# Patient Record
(demographics unavailable — no encounter records)

---

## 2024-11-18 NOTE — HISTORY OF PRESENT ILLNESS
[Born at ___ Wks Gestation] : The patient was born at [unfilled] weeks gestation [] : via normal spontaneous vaginal delivery [Other: _____] : at [unfilled] [(1) _____] : [unfilled] [(5) _____] : [unfilled] [Nuchal Cord] : nuchal cord [BW: _____] : weight of [unfilled] [Length: _____] : length of [unfilled] [HC: _____] : head circumference of [unfilled] [DW: _____] : Discharge weight was [unfilled] [Time of Birth: _____] : Time of birth was [unfilled] [Significant Hx: ____] : The mother's  medical history is significant for [unfilled] [None] : There are no risk factors [FreeTextEntry8] : cchd normal [id number] hearing test passed  poct 2.7 at 712am on 11/16 a+ c_ [Well-balanced] : well-balanced [Normal] : Normal [___ voids per day] : [unfilled] voids per day [Frequency of stools: ___] : Frequency of stools: [unfilled]  stools [per day] : per day. [In Bassinet/Crib] : sleeps in bassinet/crib [On back] : sleeps on back [Co-sleeping] : no co-sleeping [Loose bedding, pillow, toys, and/or bumpers in crib] : no loose bedding, pillow, toys, and/or bumpers in crib [Hepatitis B Vaccine Given] : Hepatitis B vaccine given [Nirsevimab Given] : Nirsevimab not given [FreeTextEntry1] : mmom marginal cord insertion.  100mg zoloft for anxiety valtrex twice per day starting at 36 weeks.   discharged on 11/16 fussy at breast and suck for a few seconds and then become upset mom started to pump  getting 1 oz after an hour 20 mins      RSV antibody at 37 weeks received by mom.

## 2024-11-18 NOTE — HISTORY OF PRESENT ILLNESS
[Born at ___ Wks Gestation] : The patient was born at [unfilled] weeks gestation [] : via normal spontaneous vaginal delivery [Other: _____] : at [unfilled] [(1) _____] : [unfilled] [(5) _____] : [unfilled] [Nuchal Cord] : nuchal cord [BW: _____] : weight of [unfilled] [Length: _____] : length of [unfilled] [HC: _____] : head circumference of [unfilled] [DW: _____] : Discharge weight was [unfilled] [Time of Birth: _____] : Time of birth was [unfilled] [Significant Hx: ____] : The mother's  medical history is significant for [unfilled] [None] : There are no risk factors [FreeTextEntry8] : cchd normal [id number] hearing test passed  poct 2.7 at 712am on 11/16 a+ c_ [Well-balanced] : well-balanced [Normal] : Normal [___ voids per day] : [unfilled] voids per day [Frequency of stools: ___] : Frequency of stools: [unfilled]  stools [per day] : per day. [In Bassinet/Crib] : sleeps in bassinet/crib [On back] : sleeps on back [Co-sleeping] : no co-sleeping [Loose bedding, pillow, toys, and/or bumpers in crib] : no loose bedding, pillow, toys, and/or bumpers in crib [Hepatitis B Vaccine Given] : Hepatitis B vaccine given [Nirsevimab Given] : Nirsevimab not given [FreeTextEntry1] : mmom marginal cord insertion.  100mg zoloft for anxiety valtrex twice per day starting at 36 weeks.   discharged on 11/16 fussy at breast and suck for a few seconds and then become upset mom started to pump  getting 1 oz after an hour 20 mins      RSV antibody at 37 weeks received by mom.

## 2024-11-29 NOTE — HISTORY OF PRESENT ILLNESS
[de-identified] : Weight check [FreeTextEntry6] : Makenzie is an 11 day old ex full term F who presents for weight check.  BW 3000g Feeds EHM  1 oz every hour.  Started vit D supplement Voiding and stooling appropriately

## 2024-11-29 NOTE — HISTORY OF PRESENT ILLNESS
[de-identified] : Weight check [FreeTextEntry6] : Makenzie is an 11 day old ex full term F who presents for weight check.  BW 3000g Feeds EHM  1 oz every hour.  Started vit D supplement Voiding and stooling appropriately

## 2024-11-29 NOTE — PHYSICAL EXAM
[No Acute Distress] : no acute distress [Alert] : alert [Normocephalic] : normocephalic [Discharge] : no discharge [Pink Nasal Mucosa] : pink nasal mucosa [Erythematous Oropharynx] : nonerythematous oropharynx [Clear to Auscultation Bilaterally] : clear to auscultation bilaterally [Regular Rate and Rhythm] : regular rate and rhythm [Normal S1, S2 audible] : normal S1, S2 audible [Murmurs] : no murmurs [Soft] : soft [Tender] : nontender [Distended] : nondistended [Normal Bowel Sounds] : normal bowel sounds [Hepatosplenomegaly] : no hepatosplenomegaly [Normal External Genitalia] : normal external genitalia [Moves All Extremities x 4] : moves all extremities x4 [Normotonic] : normotonic [NL] : warm, clear [FreeTextEntry2] : flat anterior fontanelle [FreeTextEntry5] : red reflex b/l [FreeTextEntry3] : normal outer appearance

## 2024-11-29 NOTE — DISCUSSION/SUMMARY
[FreeTextEntry1] : Makenzie is an 11 day old ex full term F who presents for weight check. Regained birth weight.  Continue current feeding plan and vitamin D.  NBS wnl.  Mother received RSV vaccine. Return for 1 mo wce

## 2024-12-05 NOTE — HISTORY OF PRESENT ILLNESS
[Normal] : Normal [Frequency of stools: ___] : Frequency of stools: [unfilled]  stools [per day] : per day. [Yellow] : yellow [Seedy] : seedy [In Bassinet/Crib] : sleeps in bassinet/crib [On back] : sleeps on back [No] : No cigarette smoke exposure [Rear facing car seat in back seat] : Rear facing car seat in back seat [Carbon Monoxide Detectors] : Carbon monoxide detectors at home [Smoke Detectors] : Smoke detectors at home. [Mother] : mother [Father] : father [Breast milk] : breast milk [Vitamins ___] : Patient takes [unfilled] vitamins daily [Well-balanced] : well-balanced [___ voids per day] : [unfilled] voids per day [Co-sleeping] : no co-sleeping [Loose bedding, pillow, toys, and/or bumpers in crib] : no loose bedding, pillow, toys, and/or bumpers in crib [Exposure to electronic nicotine delivery system] : No exposure to electronic nicotine delivery system [FreeTextEntry1] : Makenzie is a 21 day old F who presents early for 1 mo wce. Will be in [place] through early Jan Feeds EHM 2-4oz 8x per day.  Takes vitamin D.

## 2024-12-05 NOTE — PHYSICAL EXAM
[Alert] : alert [Acute Distress] : no acute distress [Normocephalic] : normocephalic [Flat Open Anterior [place]] : flat open anterior fontanelle [Red Reflex Bilateral] : red reflex bilateral [Normally Placed Ears] : normally placed ears [Auricles Well Formed] : auricles well formed [Nares Patent] : nares patent [Uvula Midline] : uvula midline [Supple, full passive range of motion] : supple, full passive range of motion [Clear to Auscultation Bilaterally] : clear to auscultation bilaterally [Regular Rate and Rhythm] : regular rate and rhythm [S1, S2 present] : S1, S2 present [Murmurs] : no murmurs [+2 Femoral Pulses] : +2 femoral pulses [Soft] : soft [Tender] : nontender [Distended] : not distended [Bowel Sounds] : bowel sounds present [Hepatomegaly] : no hepatomegaly [Splenomegaly] : no splenomegaly [Normal external genitailia] : normal external genitalia [Normally Placed] : normally placed [Bustos-Ortolani] : negative Bustos-Ortolani [Symmetric Flexed Extremities] : symmetric flexed extremities [Straight] : straight [Startle Reflex] : startle reflex present [Suck Reflex] : suck reflex present [Palmar Grasp] : palmar grasp reflex present [Plantar Grasp] : plantar grasp reflex present [Symmetric [place]] : symmetric [place] [Rash and/or lesion present] : no rash/lesion

## 2024-12-05 NOTE — DISCUSSION/SUMMARY
[Normal Growth] : growth [Normal Development] : development  [No Elimination Concerns] : elimination [Continue Regimen] : feeding [No Skin Concerns] : skin [Normal Sleep Pattern] : sleep [None] : no medical problems [Anticipatory Guidance Given] : Anticipatory guidance addressed as per the history of present illness section [Parental Well-Being] : parental well-being [Family Adjustment] : family adjustment [Feeding Routines] : feeding routines [Infant Adjustment] : infant adjustment [Safety] : safety [No Medications] : ~He/She~ is not on any medications [Mother] : mother [Father] : father [FreeTextEntry1] : Makenzie is a 21 day old F who presents for wce. Growing and developing well for age.   #1mo - Recommend exclusive breastfeeding, 8-12 feedings per day. Mother should continue prenatal vitamins and avoid alcohol. If formula is needed, recommend iron-fortified formulations, 2-4 oz every 2-3 hrs. When in car, patient should be in rear-facing car seat in back seat. Put baby to sleep on back, in own crib with no loose or soft bedding. Help baby to develop sleep and feeding routines. May offer pacifier if needed. Start tummy time when awake. Limit baby's exposure to others, especially those with fever or unknown vaccine status. Parents counseled to call if rectal temperature >100.4 degrees F. - return for 2 mo wce

## 2024-12-05 NOTE — DEVELOPMENTAL MILESTONES
[Normal Development] : Normal Development [None] : none [Calms when picked up or spoken to] : calms when picked up or spoken to [Looks briefly at objects] : looks briefly at objects [Alerts to unexpected sound] : alerts to unexpected sound [Makes brief short vowel sounds] : makes brief short vowel sounds [Holds chin up in prone] : holds chin up in prone [Holds fingers more open at rest] : holds fingers more open at rest [Passed] : passed [FreeTextEntry2] : 6

## 2025-01-06 NOTE — DISCUSSION/SUMMARY
[Normal Growth] : growth [Normal Development] : development  [No Elimination Concerns] : elimination [Continue Regimen] : feeding [No Skin Concerns] : skin [Normal Sleep Pattern] : sleep [None] : no medical problems [Anticipatory Guidance Given] : Anticipatory guidance addressed as per the history of present illness section [Parental (Maternal) Well-Being] : parental (maternal) well-being [Infant-Family Synchrony] : infant-family synchrony [Nutritional Adequacy] : nutritional adequacy [Infant Behavior] : infant behavior [Safety] : safety [Age Approp Vaccines] : Age appropriate vaccines administered [No Medications] : ~He/She~ is not on any medications [Mother] : mother [Father] : father [Parental Concerns Addressed] : Parental concerns addressed [] : The components of the vaccine(s) to be administered today are listed in the plan of care. The disease(s) for which the vaccine(s) are intended to prevent and the risks have been discussed with the caretaker.  The risks are also included in the appropriate vaccination information statements which have been provided to the patient's caregiver.  The caregiver has given consent to vaccinate. [FreeTextEntry1] : Makenzie is a 2 mo F who presents for wce. Growing and developing well for age.   #2mo  - vaxelis, prevnar and rotavirus vaccines administered - Recommend exclusive breastfeeding, 8-12 feedings per day. Mother should continue prenatal vitamins and avoid alcohol. If formula is needed, recommend iron-fortified formulations, 2-4 oz every 3-4 hrs. When in car, patient should be in rear-facing car seat in back seat. Put baby to sleep on back, in own crib with no loose or soft bedding. Help baby to maintain sleep and feeding routines. May offer pacifier if needed. Continue tummy time when awake. Parents counseled to call if rectal temperature >100.4 degrees F. - return for 4 mo wce

## 2025-01-06 NOTE — HISTORY OF PRESENT ILLNESS
[Breast milk] : breast milk [Vitamins ___] : Patient takes [unfilled] vitamins daily [Normal] : Normal [___ voids per day] : [unfilled] voids per day [Frequency of stools: ___] : Frequency of stools: [unfilled]  stools [per day] : per day. [In Bassinet/Crib] : sleeps in bassinet/crib [On back] : sleeps on back [No] : No cigarette smoke exposure [Rear facing car seat in back seat] : Rear facing car seat in back seat [Carbon Monoxide Detectors] : Carbon monoxide detectors at home [Smoke Detectors] : Smoke detectors at home. [Mother] : mother [Father] : father [Co-sleeping] : no co-sleeping [Loose bedding, pillow, toys, and/or bumpers in crib] : no loose bedding, pillow, toys, and/or bumpers in crib [Exposure to electronic nicotine delivery system] : No exposure to electronic nicotine delivery system [FreeTextEntry1] : Makenzie is a 2 mo F who presents for 2 mo wce.  Feeds EHM 3-5 oz each feeding 6-8x per day.  Sleep stretch 7 hours. Takes vitamin D

## 2025-01-06 NOTE — PHYSICAL EXAM
[Alert] : alert [Acute Distress] : no acute distress [Flat Open Anterior [place]] : flat open anterior fontanelle [PERRL] : PERRL [Red Reflex Bilateral] : red reflex bilateral [Normally Placed Ears] : normally placed ears [Auricles Well Formed] : auricles well formed [Nares Patent] : nares patent [Uvula Midline] : uvula midline [Supple, full passive range of motion] : supple, full passive range of motion [Clear to Auscultation Bilaterally] : clear to auscultation bilaterally [Regular Rate and Rhythm] : regular rate and rhythm [S1, S2 present] : S1, S2 present [Murmurs] : no murmurs [+2 Femoral Pulses] : +2 femoral pulses [Soft] : soft [Tender] : nontender [Distended] : not distended [Bowel Sounds] : bowel sounds present [Hepatomegaly] : no hepatomegaly [Splenomegaly] : no splenomegaly [Normal external genitailia] : normal external genitalia [Normally Placed] : normally placed [Bustos-Ortolani] : negative Bustos-Ortolani [Symmetric Flexed Extremities] : symmetric flexed extremities [Straight] : straight [Suck Reflex] : suck reflex present [Palmar Grasp] : palmar grasp reflex present [Plantar Grasp] : plantar grasp reflex present [Symmetric [place]] : symmetric [place] [Rash and/or lesion present] : no rash/lesion

## 2025-03-07 NOTE — HISTORY OF PRESENT ILLNESS
[Breast milk] : breast milk [Vitamins ___] : Patient takes [unfilled] vitamins daily [Normal] : Normal [___ voids per day] : [unfilled] voids per day [Frequency of stools: ___] : Frequency of stools: [unfilled]  stools [In Bassinet/Crib] : sleeps in bassinet/crib [On back] : sleeps on back [Sleeps 12-16 hours per 24 hours (including naps)] : sleeps 12-16 hours per 24 hours (including naps) [Tummy time] : tummy time [No] : No cigarette smoke exposure [Rear facing car seat in back seat] : Rear facing car seat in back seat [Smoke Detectors] : Smoke detectors at home. [Mother] : mother [Father] : father [Carbon Monoxide Detectors] : Carbon monoxide detectors at home

## 2025-03-07 NOTE — DISCUSSION/SUMMARY
[Normal Growth] : growth [Normal Development] : development  [No Elimination Concerns] : elimination [Continue Regimen] : feeding [No Skin Concerns] : skin [Normal Sleep Pattern] : sleep [None] : no medical problems [Anticipatory Guidance Given] : Anticipatory guidance addressed as per the history of present illness section [Family Functioning] : family functioning [Nutritional Adequacy and Growth] : nutritional adequacy and growth [Infant Development] : infant development [Oral Health] : oral health [Safety] : safety [Age Approp Vaccines] : Age appropriate vaccines administered [No Medications] : ~He/She~ is not on any medications [Parental Concerns Addressed] : Parental concerns addressed [] : The components of the vaccine(s) to be administered today are listed in the plan of care. The disease(s) for which the vaccine(s) are intended to prevent and the risks have been discussed with the caretaker.  The risks are also included in the appropriate vaccination information statements which have been provided to the patient's caregiver.  The caregiver has given consent to vaccinate.

## 2025-03-07 NOTE — PHYSICAL EXAM
[Alert] : alert [Flat Open Anterior [place]] : flat open anterior fontanelle [Red Reflex] : red reflex bilateral [Normally Placed Ears] : normally placed ears [Auricles Well Formed] : auricles well formed [Clear Tympanic membranes] : clear tympanic membranes [Nares Patent] : nares patent [Palate Intact] : palate intact [Uvula Midline] : uvula midline [Clear to Auscultation Bilaterally] : clear to auscultation bilaterally [Regular Rate and Rhythm] : regular rate and rhythm [S1, S2 present] : S1, S2 present [+2 Femoral Pulses] : (+) 2 femoral pulses [Soft] : soft [Bowel Sounds] : bowel sounds present [Normal External Genitalia] : normal external genitalia [Normal Vaginal Introitus] : normal vaginal introitus [Normally Placed] : normally placed [Straight] : straight [Plantar Grasp] : plantar grasp reflex present

## 2025-03-07 NOTE — PHYSICAL EXAM
[Alert] : alert [Flat Open Anterior [place]] : flat open anterior fontanelle [Red Reflex] : red reflex bilateral [Normally Placed Ears] : normally placed ears [Auricles Well Formed] : auricles well formed [Clear Tympanic membranes] : clear tympanic membranes [Nares Patent] : nares patent [Palate Intact] : palate intact [Uvula Midline] : uvula midline [Clear to Auscultation Bilaterally] : clear to auscultation bilaterally [Regular Rate and Rhythm] : regular rate and rhythm [S1, S2 present] : S1, S2 present [+2 Femoral Pulses] : (+) 2 femoral pulses [Soft] : soft [Bowel Sounds] : bowel sounds present [Normal External Genitalia] : normal external genitalia [Normal Vaginal Introitus] : normal vaginal introitus [Normally Placed] : normally placed [Straight] : straight [Plantar Grasp] : plantar grasp reflex present

## 2025-04-25 NOTE — PHYSICAL EXAM
[No Acute Distress] : no acute distress [Alert] : alert [de-identified] : Erythematous large irregular rough patch L upper chest/shoulder; small erythematous papules on chest and abdomen; multiple rough erythematous dry patches on arms, legs popliteal fossae.

## 2025-04-25 NOTE — DISCUSSION/SUMMARY
[FreeTextEntry1] : #eczema - continue with current skin care regimen - if itching is progressive and Makenzie unable to sleep, then 1.5ml zyrtec no more than every 24 hours (discussed this dose with mother over phone on 4/25/25). Only use as 2nd line to steroid ointments.  - triamcinolone 0.1 rx sent - advised dairy free soy free diet for mother as 2 week trial

## 2025-04-25 NOTE — HISTORY OF PRESENT ILLNESS
[de-identified] : Eczema [FreeTextEntry6] : Makenzie is a 5 mo F who presents due to eczema follow up.  steroid helps but makes her dry, flakes skin.  Parents apply a lot of vaseline, there are certain patches on arms that are weeping, keep it as dry as possible, and exposed.  scratches stomach and there are marks has made herself bleed before.   S: vanicream gentle face cleanser, 2 pumps in water M: vanicream moisturizer. vaseline instead of aquaphor D: [place] unscented.

## 2025-04-25 NOTE — HISTORY OF PRESENT ILLNESS
[de-identified] : Eczema [FreeTextEntry6] : Makenzie is a 5 mo F who presents due to eczema follow up.  steroid helps but makes her dry, flakes skin.  Parents apply a lot of vaseline, there are certain patches on arms that are weeping, keep it as dry as possible, and exposed.  scratches stomach and there are marks has made herself bleed before.   S: vanicream gentle face cleanser, 2 pumps in water M: vanicream moisturizer. vaseline instead of aquaphor D: [place] unscented.

## 2025-04-25 NOTE — PHYSICAL EXAM
[No Acute Distress] : no acute distress [Alert] : alert [de-identified] : Erythematous large irregular rough patch L upper chest/shoulder; small erythematous papules on chest and abdomen; multiple rough erythematous dry patches on arms, legs popliteal fossae.

## 2025-04-25 NOTE — ADDENDUM
[FreeTextEntry1] : Touched based with mother via phone on 4/25/25 to follow up. Makenzie overall doing better. Not needed to use zyrtec at this point.

## 2025-05-21 NOTE — PHYSICAL EXAM
[Alert] : alert [Acute Distress] : no acute distress [Normocephalic] : normocephalic [Flat Open Anterior [place]] : flat open anterior fontanelle [Red Reflex] : red reflex bilateral [Normally Placed Ears] : normally placed ears [Auricles Well Formed] : auricles well formed [Clear Tympanic membranes] : clear tympanic membranes [Bony landmarks visible] : bony landmarks visible [Discharge] : no discharge [Nares Patent] : nares patent [Palate Intact] : palate intact [Uvula Midline] : uvula midline [Supple, full passive range of motion] : supple, full passive range of motion [Clear to Auscultation Bilaterally] : clear to auscultation bilaterally [Regular Rate and Rhythm] : regular rate and rhythm [S1, S2 present] : S1, S2 present [Murmurs] : no murmurs [+2 Femoral Pulses] : (+) 2 femoral pulses [Soft] : soft [Tender] : nontender [Distended] : nondistended [Bowel Sounds] : bowel sounds present [Hepatomegaly] : no hepatomegaly [Splenomegaly] : no splenomegaly [Normal External Genitalia] : normal external genitalia [Normally Placed] : normally placed [Bustos-Ortolani] : negative Bustos-Ortolani [Allis Sign] : negative Allis sign [Symmetric Buttocks Creases] : symmetric buttocks creases [Straight] : straight [Cranial Nerves Grossly Intact] : cranial nerves grossly intact [de-identified] : Extensive erythematous, dry, rough patches AC fossae b/l, popliteal fossae b/l, L shoulder and L chest.

## 2025-05-21 NOTE — DEVELOPMENTAL MILESTONES
[Normal Development] : Normal Development [None] : none [Pats or smiles at reflection] : pats or smiles at reflection [Begins to turn when name called] : begins to turn when name called [Babbles] : babbles [Rolls over prone to supine] : rolls over prone to supine [Sits briefly without support] : sits briefly without support [Reaches for object and transfers] : reaches for object and transfers [Rakes small object with 4 fingers] : rakes small object with 4 fingers [[place] small object on surface] : bangs small object on surface [Passed] : passed [FreeTextEntry2] : 3

## 2025-05-21 NOTE — HISTORY OF PRESENT ILLNESS
[Breast milk] : breast milk [Formula ___ oz/feed] : [unfilled] oz of formula per feed [Hours between feeds ___] : Child is fed every [unfilled] hours [Normal] : Normal [___ voids per day] : [unfilled] voids per day [Frequency of stools: ___] : Frequency of stools: [unfilled]  stools [Yellow] : yellow [Seedy] : seedy [In Bassinet/Crib] : sleeps in bassinet/crib [On back] : sleeps on back [Sleeps 12-16 hours per 24 hours (including naps)] : sleeps 12-16 hours per 24 hours (including naps) [Tummy time] : tummy time [No] : No cigarette smoke exposure [Rear facing car seat in back seat] : Rear facing car seat in back seat [Carbon Monoxide Detectors] : Carbon monoxide detectors at home [Smoke Detectors] : Smoke detectors at home. [Mother] : mother [Father] : father [Loose bedding, pillow, toys, and/or bumpers in crib] : no loose bedding, pillow, toys, and/or bumpers in crib [Exposure to electronic nicotine delivery system] : No exposure to electronic nicotine delivery system [de-identified] : 6:30p-6:30a [FreeTextEntry1] : Makenzie is a 6mo F who presents for 4 mo wce. She has had ongoing eczema. Has rx for both HTC 2.5 and Triamcinolone 0.1 She has history of brachycephaly for which she will be getting helmet. She was evaluated by neurosurgeon who did not recommend helmet, but parents are choosing to pursue for better cosmetic outcome. She has been actively working with PT.  Feeds EHM 4oz each feeding 6-8x per day. Mother went on dairy and soy elimination diet. Mother had on accident had dairy a few days ago, and Makenzie's skin flared up shortly after. They switched to All free and clear detergent.  Sleep stretch 7 hours. Takes vitamin D  Solids: banana  Concerns: Naps with mouth open; ongoing eczema

## 2025-05-21 NOTE — HISTORY OF PRESENT ILLNESS
[Breast milk] : breast milk [Formula ___ oz/feed] : [unfilled] oz of formula per feed [Hours between feeds ___] : Child is fed every [unfilled] hours [Normal] : Normal [___ voids per day] : [unfilled] voids per day [Frequency of stools: ___] : Frequency of stools: [unfilled]  stools [Yellow] : yellow [Seedy] : seedy [In Bassinet/Crib] : sleeps in bassinet/crib [On back] : sleeps on back [Sleeps 12-16 hours per 24 hours (including naps)] : sleeps 12-16 hours per 24 hours (including naps) [Tummy time] : tummy time [No] : No cigarette smoke exposure [Rear facing car seat in back seat] : Rear facing car seat in back seat [Carbon Monoxide Detectors] : Carbon monoxide detectors at home [Smoke Detectors] : Smoke detectors at home. [Mother] : mother [Father] : father [Loose bedding, pillow, toys, and/or bumpers in crib] : no loose bedding, pillow, toys, and/or bumpers in crib [Exposure to electronic nicotine delivery system] : No exposure to electronic nicotine delivery system [de-identified] : 6:30p-6:30a [FreeTextEntry1] : Makenzie is a 6mo F who presents for 4 mo wce. She has had ongoing eczema. Has rx for both HTC 2.5 and Triamcinolone 0.1 She has history of brachycephaly for which she will be getting helmet. She was evaluated by neurosurgeon who did not recommend helmet, but parents are choosing to pursue for better cosmetic outcome. She has been actively working with PT.  Feeds EHM 4oz each feeding 6-8x per day. Mother went on dairy and soy elimination diet. Mother had on accident had dairy a few days ago, and Makenzie's skin flared up shortly after. They switched to All free and clear detergent.  Sleep stretch 7 hours. Takes vitamin D  Solids: banana  Concerns: Naps with mouth open; ongoing eczema

## 2025-05-21 NOTE — DEVELOPMENTAL MILESTONES
[Normal Development] : Normal Development [None] : none [Pats or smiles at reflection] : pats or smiles at reflection [Begins to turn when name called] : begins to turn when name called [Babbles] : babbles [Rolls over prone to supine] : rolls over prone to supine [Sits briefly without support] : sits briefly without support [Reaches for object and transfers] : reaches for object and transfers [Rakes small object with 4 fingers] : rakes small object with 4 fingers [[place] small object on surface] : bangs small object on surface [Passed] : passed [FreeTextEntry2] : 3

## 2025-05-21 NOTE — PHYSICAL EXAM
[Alert] : alert [Acute Distress] : no acute distress [Normocephalic] : normocephalic [Flat Open Anterior [place]] : flat open anterior fontanelle [Red Reflex] : red reflex bilateral [Normally Placed Ears] : normally placed ears [Auricles Well Formed] : auricles well formed [Clear Tympanic membranes] : clear tympanic membranes [Bony landmarks visible] : bony landmarks visible [Discharge] : no discharge [Nares Patent] : nares patent [Palate Intact] : palate intact [Uvula Midline] : uvula midline [Supple, full passive range of motion] : supple, full passive range of motion [Clear to Auscultation Bilaterally] : clear to auscultation bilaterally [Regular Rate and Rhythm] : regular rate and rhythm [S1, S2 present] : S1, S2 present [Murmurs] : no murmurs [+2 Femoral Pulses] : (+) 2 femoral pulses [Soft] : soft [Tender] : nontender [Distended] : nondistended [Bowel Sounds] : bowel sounds present [Hepatomegaly] : no hepatomegaly [Splenomegaly] : no splenomegaly [Normal External Genitalia] : normal external genitalia [Normally Placed] : normally placed [Bustos-Ortolani] : negative Bustos-Ortolani [Allis Sign] : negative Allis sign [Symmetric Buttocks Creases] : symmetric buttocks creases [Straight] : straight [Cranial Nerves Grossly Intact] : cranial nerves grossly intact [de-identified] : Extensive erythematous, dry, rough patches AC fossae b/l, popliteal fossae b/l, L shoulder and L chest.

## 2025-05-21 NOTE — HISTORY OF PRESENT ILLNESS
[Breast milk] : breast milk [Formula ___ oz/feed] : [unfilled] oz of formula per feed [Hours between feeds ___] : Child is fed every [unfilled] hours [Normal] : Normal [___ voids per day] : [unfilled] voids per day [Frequency of stools: ___] : Frequency of stools: [unfilled]  stools [Yellow] : yellow [Seedy] : seedy [In Bassinet/Crib] : sleeps in bassinet/crib [On back] : sleeps on back [Sleeps 12-16 hours per 24 hours (including naps)] : sleeps 12-16 hours per 24 hours (including naps) [Tummy time] : tummy time [No] : No cigarette smoke exposure [Rear facing car seat in back seat] : Rear facing car seat in back seat [Carbon Monoxide Detectors] : Carbon monoxide detectors at home [Smoke Detectors] : Smoke detectors at home. [Mother] : mother [Father] : father [Loose bedding, pillow, toys, and/or bumpers in crib] : no loose bedding, pillow, toys, and/or bumpers in crib [Exposure to electronic nicotine delivery system] : No exposure to electronic nicotine delivery system [de-identified] : 6:30p-6:30a [FreeTextEntry1] : Makenzie is a 6mo F who presents for 4 mo wce. She has had ongoing eczema. Has rx for both HTC 2.5 and Triamcinolone 0.1 She has history of brachycephaly for which she will be getting helmet. She was evaluated by neurosurgeon who did not recommend helmet, but parents are choosing to pursue for better cosmetic outcome. She has been actively working with PT.  Feeds EHM 4oz each feeding 6-8x per day. Mother went on dairy and soy elimination diet. Mother had on accident had dairy a few days ago, and Makenzie's skin flared up shortly after. They switched to All free and clear detergent.  Sleep stretch 7 hours. Takes vitamin D  Solids: banana  Concerns: Naps with mouth open; ongoing eczema

## 2025-05-21 NOTE — PHYSICAL EXAM
[Alert] : alert [Acute Distress] : no acute distress [Normocephalic] : normocephalic [Flat Open Anterior [place]] : flat open anterior fontanelle [Red Reflex] : red reflex bilateral [Normally Placed Ears] : normally placed ears [Auricles Well Formed] : auricles well formed [Clear Tympanic membranes] : clear tympanic membranes [Bony landmarks visible] : bony landmarks visible [Discharge] : no discharge [Nares Patent] : nares patent [Palate Intact] : palate intact [Uvula Midline] : uvula midline [Supple, full passive range of motion] : supple, full passive range of motion [Clear to Auscultation Bilaterally] : clear to auscultation bilaterally [Regular Rate and Rhythm] : regular rate and rhythm [S1, S2 present] : S1, S2 present [Murmurs] : no murmurs [+2 Femoral Pulses] : (+) 2 femoral pulses [Soft] : soft [Tender] : nontender [Distended] : nondistended [Bowel Sounds] : bowel sounds present [Hepatomegaly] : no hepatomegaly [Splenomegaly] : no splenomegaly [Normal External Genitalia] : normal external genitalia [Normally Placed] : normally placed [Bustos-Ortolani] : negative Bustos-Ortolani [Allis Sign] : negative Allis sign [Symmetric Buttocks Creases] : symmetric buttocks creases [Straight] : straight [Cranial Nerves Grossly Intact] : cranial nerves grossly intact [de-identified] : Extensive erythematous, dry, rough patches AC fossae b/l, popliteal fossae b/l, L shoulder and L chest.

## 2025-05-21 NOTE — DEVELOPMENTAL MILESTONES
[Normal Development] : Normal Development [None] : none [Pats or smiles at reflection] : pats or smiles at reflection [Begins to turn when name called] : begins to turn when name called [Babbles] : babbles [Rolls over prone to supine] : rolls over prone to supine [Sits briefly without support] : sits briefly without support [Reaches for object and transfers] : reaches for object and transfers [Rakes small object with 4 fingers] : rakes small object with 4 fingers [[place] small object on surface] : bangs small object on surface [Passed] : passed [FreeTextEntry2] : 3

## 2025-05-21 NOTE — DISCUSSION/SUMMARY
[Normal Growth] : growth [Normal Development] : development [None] : No medical problems [No Elimination Concerns] : elimination [No Feeding Concerns] : feeding [No Skin Concerns] : skin [Normal Sleep Pattern] : sleep [Family Functioning] : family functioning [Nutrition and Feeding] : nutrition and feeding [Infant Development] : infant development [Oral Health] : oral health [Safety] : safety [No Medications] : ~He/She~ is not on any medications [Mother] : mother [Father] : father [Parental Concerns Addressed] : Parental concerns addressed [] : The components of the vaccine(s) to be administered today are listed in the plan of care. The disease(s) for which the vaccine(s) are intended to prevent and the risks have been discussed with the caretaker.  The risks are also included in the appropriate vaccination information statements which have been provided to the patient's caregiver.  The caregiver has given consent to vaccinate. [FreeTextEntry1] : DIRK is a 6 month mo girl who presents for wce. Growing and developing well for age. Well appearing on exam.  #atopic dermatitis - at this point all environmental measures have been taken; mother is on dairy/soy elimination diet, all fragrance free, non plant based products being used for soap, moisturizer and detergent.  - Unwanted side effects of skin dryness occurring due to steroid ointments of HTC and triamcinolone.  - Will pursue rx for eucrisa & PA given unwanted side effects of steroid containing ointments and ongoing moderate eczema despite best efforts.

## 2025-06-04 NOTE — PHYSICAL EXAM
[TextEntry] : - Physical Examination (PE) : General Alert, interactive 6-month-old female, not in apparent distress. Head No palpable skull fractures, no significant swelling or bleeding noted. No pain to palpation of skull. Face pink patch developing on L cheek. Eyes Pupils equal and reactive to light. No apparent visual tracking issues. Ears No blood in ear canals, TM appear wnl. Mouth Small vesicle noted on gum, unrelated to fall. Neck Supple, full range of motion. Chest Clear to auscultation bilaterally. Cardiovascular Regular rate and rhythm, no murmurs appreciated. Abdomen Soft, non-distended, non-tender. Extremities Moving all extremities equally, no apparent pain or discomfort. Neurological Alert, responsive, no focal deficits noted.

## 2025-06-04 NOTE — HISTORY OF PRESENT ILLNESS
[de-identified] : - Summary : 6-month-old female infant brought in by mother for evaluation after falling off a bed approximately one hour ago. [FreeTextEntry6] : Subjective: - Chief Complaint (CC) : Fall from bed, landed face down on carpeted floor at 12:21 PM. - History of Present Illness : The patient is a 6-month-old female who fell off a queen-sized bed approx 2.5ft in height at approximately 12;21 PM today. The mother reports that she had placed the child in the middle of the bed while putting away clothes in the same room. When she turned back, she witnessed the child in the process of falling off the bed. The infant landed face down on a carpeted floor. The child did not cry immediately but appeared surprised/stunned, then began crying in under 30 seconds. Makenzie's eyes were open immediately after the fall and she did not appear to lose consciousness. The crying was intense but the child calmed down within two minutes and resumed smiling. No loss of consciousness was observed. The patient had a spit-up about 20 minutes after the fall, which the mother describes as similar to her regular spit-ups. The child had eaten strawberries and avocado approximately 30 minutes before the incident. The mother reports that the child has been acting normally since the incident, though it is currently her usual nap time.

## 2025-06-04 NOTE — REVIEW OF SYSTEMS
[TextEntry] : - Review of Systems : - General : No fever, chills, or changes in appetite reported. - Neurological : No loss of consciousness. Alert and interactive during examination. - Musculoskeletal : No apparent pain or discomfort noted during examination. - Cardiovascular : No concerns reported. - Respiratory : No respiratory distress noted. - Gastrointestinal : One episode of spit-up 20 minutes after the fall, described as normal. - Genitourinary : No concerns reported. - Integumentary : Possible bruising noted on left cheek. - Psychiatric : No concerns reported. - Medications : No current medications reported. - Allergies : No known allergies reported.

## 2025-06-04 NOTE — DISCUSSION/SUMMARY
[FreeTextEntry1] : Assessment and Plan: - Minor Head Injury : Patient presents with a history of falling from a bed (estimated height 2.5 feet) onto a carpeted floor. No loss of consciousness, no vomiting (apart from normal spit-up), and no abnormal behavior observed. Physical examination reveals no signs of significant injury. - Advise home observation - Provided anticipatory guidance on signs and symptoms that would warrant immediate medical attention (difficulty waking, weakness in arms or legs, multiple episodes of vomiting, inconsolable crying, or any deviation from baseline behavior) advise to go to ER if these symptoms occur.   - Cleared to attend scheduled physical therapy appointment today  - No need for imaging studies or ER observation at this time based on current presentation according to LINDA  - Follow up with primary care provider if any concerns arise  - Reassurance provided to mother about child's condition and prognosis  - Possible Oral Lesion : Small vesicle noted on gum, unrelated to fall. - Observation  - No immediate intervention required  - Follow up with primary care provider if lesion persists or causes discomfort - could be oncoming viral illness or due to pressure from food/eating/gumline